# Patient Record
Sex: FEMALE | Race: WHITE | NOT HISPANIC OR LATINO | Employment: OTHER | URBAN - NONMETROPOLITAN AREA
[De-identification: names, ages, dates, MRNs, and addresses within clinical notes are randomized per-mention and may not be internally consistent; named-entity substitution may affect disease eponyms.]

---

## 2024-10-15 ENCOUNTER — OFFICE VISIT (OUTPATIENT)
Dept: URGENT CARE | Facility: CLINIC | Age: 69
End: 2024-10-15
Payer: MEDICARE

## 2024-10-15 VITALS
TEMPERATURE: 97.8 F | SYSTOLIC BLOOD PRESSURE: 122 MMHG | DIASTOLIC BLOOD PRESSURE: 74 MMHG | OXYGEN SATURATION: 94 % | RESPIRATION RATE: 16 BRPM | WEIGHT: 160 LBS | HEART RATE: 80 BPM | BODY MASS INDEX: 26.66 KG/M2 | HEIGHT: 65 IN

## 2024-10-15 DIAGNOSIS — J98.01 BRONCHOSPASM: ICD-10-CM

## 2024-10-15 DIAGNOSIS — B96.89 BACTERIAL SINUSITIS: ICD-10-CM

## 2024-10-15 DIAGNOSIS — J32.9 BACTERIAL SINUSITIS: ICD-10-CM

## 2024-10-15 PROCEDURE — 3078F DIAST BP <80 MM HG: CPT

## 2024-10-15 PROCEDURE — 3074F SYST BP LT 130 MM HG: CPT

## 2024-10-15 PROCEDURE — 99213 OFFICE O/P EST LOW 20 MIN: CPT

## 2024-10-15 RX ORDER — ALBUTEROL SULFATE 90 UG/1
2 INHALANT RESPIRATORY (INHALATION) EVERY 6 HOURS PRN
Qty: 8.5 G | Refills: 0 | Status: SHIPPED | OUTPATIENT
Start: 2024-10-15

## 2024-10-15 RX ORDER — METHOTREXATE 2.5 MG/1
TABLET ORAL
COMMUNITY
Start: 2020-10-15

## 2024-10-15 RX ORDER — METHOCARBAMOL 750 MG/1
TABLET, FILM COATED ORAL
COMMUNITY
Start: 2024-08-20

## 2024-10-15 RX ORDER — TRAZODONE HYDROCHLORIDE 50 MG/1
TABLET, FILM COATED ORAL
COMMUNITY
Start: 2021-10-15

## 2024-10-15 RX ORDER — FOLIC ACID 1 MG/1
TABLET ORAL
COMMUNITY
Start: 2020-10-15

## 2024-10-15 RX ORDER — DILTIAZEM HYDROCHLORIDE 180 MG/1
180 CAPSULE, EXTENDED RELEASE ORAL
COMMUNITY
Start: 2024-08-15

## 2024-10-15 RX ORDER — LEVOTHYROXINE SODIUM 75 UG/1
TABLET ORAL
COMMUNITY
Start: 2015-10-15

## 2024-10-15 RX ORDER — DILTIAZEM HYDROCHLORIDE 120 MG/1
TABLET, FILM COATED ORAL
COMMUNITY
Start: 2022-10-15

## 2024-10-15 RX ORDER — ACYCLOVIR 400 MG/1
TABLET ORAL
COMMUNITY
Start: 2024-09-19

## 2024-10-15 ASSESSMENT — VISUAL ACUITY: OU: 1

## 2025-02-11 ENCOUNTER — OFFICE VISIT (OUTPATIENT)
Dept: URGENT CARE | Facility: CLINIC | Age: 70
End: 2025-02-11
Payer: MEDICARE

## 2025-02-11 VITALS
RESPIRATION RATE: 18 BRPM | SYSTOLIC BLOOD PRESSURE: 118 MMHG | HEART RATE: 85 BPM | OXYGEN SATURATION: 96 % | HEIGHT: 66 IN | TEMPERATURE: 98.8 F | WEIGHT: 169 LBS | BODY MASS INDEX: 27.16 KG/M2 | DIASTOLIC BLOOD PRESSURE: 60 MMHG

## 2025-02-11 DIAGNOSIS — J06.9 VIRAL URI WITH COUGH: ICD-10-CM

## 2025-02-11 PROCEDURE — 99213 OFFICE O/P EST LOW 20 MIN: CPT | Performed by: PHYSICIAN ASSISTANT

## 2025-02-11 PROCEDURE — 3074F SYST BP LT 130 MM HG: CPT | Performed by: PHYSICIAN ASSISTANT

## 2025-02-11 PROCEDURE — 3078F DIAST BP <80 MM HG: CPT | Performed by: PHYSICIAN ASSISTANT

## 2025-02-11 RX ORDER — PREDNISONE 20 MG/1
TABLET ORAL
Qty: 8 TABLET | Refills: 0 | Status: SHIPPED | OUTPATIENT
Start: 2025-02-11

## 2025-02-11 RX ORDER — BENZONATATE 100 MG/1
100 CAPSULE ORAL 3 TIMES DAILY PRN
Qty: 30 CAPSULE | Refills: 0 | Status: SHIPPED | OUTPATIENT
Start: 2025-02-11

## 2025-02-11 ASSESSMENT — ENCOUNTER SYMPTOMS
CHILLS: 1
FEVER: 1
HEADACHES: 1
WHEEZING: 1
COUGH: 1
MYALGIAS: 1
SORE THROAT: 1

## 2025-02-11 NOTE — PROGRESS NOTES
Subjective     Edilia Foster is a 69 y.o. female who presents with Cough (X 4 days)    HPI:  Edilia Foster is a 69 y.o. female who presents today for evaluation of cough and URI symptoms.  States that she has been sick for the past 4 days.  She has had fever, chills, congestion, runny nose, sore throat, cough.  She says that she did an at home COVID test and it was negative.  Says that the cough is now what is most bothersome for her.  Says that she feels chest congestion and coughs constantly.  She also notices some wheezing when she lays down at nighttime.  No history of asthma or COPD.  She is requesting steroids.        Review of Systems   Constitutional:  Positive for chills, fever and malaise/fatigue.   HENT:  Positive for congestion and sore throat.    Respiratory:  Positive for cough and wheezing.    Musculoskeletal:  Positive for myalgias.   Neurological:  Positive for headaches.           PMH:  has no past medical history on file.  MEDS:   Current Outpatient Medications:     traZODone (DESYREL) 50 MG Tab, , Disp: , Rfl:     methotrexate 2.5 MG tablet, , Disp: , Rfl:     methocarbamol (ROBAXIN) 750 MG Tab, TAKE 1 TABLET (750 MG TOTAL) BY MOUTH DAILY AS NEEDED FOR MUSCLE SPASMS, Disp: , Rfl:     levothyroxine (SYNTHROID) 75 MCG Tab, , Disp: , Rfl:     folic acid (FOLVITE) 1 MG Tab, , Disp: , Rfl:     diltiazem (TIAZAC) 180 MG SR capsule, Take 180 mg by mouth every day., Disp: , Rfl:     dilTIAZem (CARDIZEM) 120 MG Tab, , Disp: , Rfl:     acyclovir (ZOVIRAX) 400 MG tablet, TAKE AS NEEDED TWICE A DAY, Disp: , Rfl:     albuterol 108 (90 Base) MCG/ACT Aero Soln inhalation aerosol, Inhale 2 Puffs every 6 hours as needed for Shortness of Breath., Disp: 8.5 g, Rfl: 0  ALLERGIES: No Known Allergies  SURGHX: History reviewed. No pertinent surgical history.  SOCHX:    FH: Family history was reviewed, no pertinent findings to report      Objective     /60   Pulse 85   Temp 37.1 °C (98.8 °F) (Temporal)   " Resp 18   Ht 1.664 m (5' 5.5\")   Wt 76.7 kg (169 lb)   SpO2 96%   BMI 27.70 kg/m²      Physical Exam  Constitutional:       Appearance: She is well-developed.   HENT:      Head: Normocephalic and atraumatic.      Right Ear: Tympanic membrane, ear canal and external ear normal.      Left Ear: Tympanic membrane, ear canal and external ear normal.      Nose: Mucosal edema and congestion present. No rhinorrhea.      Mouth/Throat:      Lips: Pink.      Mouth: Mucous membranes are moist.      Pharynx: Oropharynx is clear.   Eyes:      Conjunctiva/sclera: Conjunctivae normal.      Pupils: Pupils are equal, round, and reactive to light.   Cardiovascular:      Rate and Rhythm: Normal rate and regular rhythm.      Heart sounds: Normal heart sounds. No murmur heard.  Pulmonary:      Effort: Pulmonary effort is normal.      Breath sounds: Normal breath sounds. No wheezing.   Musculoskeletal:      Cervical back: Normal range of motion.   Lymphadenopathy:      Cervical: No cervical adenopathy.   Skin:     General: Skin is warm and dry.      Capillary Refill: Capillary refill takes less than 2 seconds.   Neurological:      Mental Status: She is alert and oriented to person, place, and time.   Psychiatric:         Behavior: Behavior normal.         Judgment: Judgment normal.             Assessment & Plan     1. Viral URI with cough  - predniSONE (DELTASONE) 20 MG Tab; Take 2 tabs once daily for 4 days  Dispense: 8 Tablet; Refill: 0  - benzonatate (TESSALON) 100 MG Cap; Take 1 Capsule by mouth 3 times a day as needed for Cough.  Dispense: 30 Capsule; Refill: 0  Patient presenting for evaluation of 4 days of URI symptoms.  Home COVID test negative.  Symptoms likely secondary to influenza but she is outside of the window for antiviral treatment.  Will trial a short burst of oral prednisone.  Benzonatate capsules also sent to help with symptoms  - OTC cold/flu medications  -Supportive care also discussed to include the use of " saline nasal rinses, steam inhalation, and the use of a cool-mist humidifier in the bedroom at night.  - PO fluids  - Rest  - Tylenol or ibuprofen as needed for fever > 100.4 F            Differential Diagnosis, natural history, and supportive care discussed. Return to the Urgent Care or follow up with your PCP if symptoms fail to resolve, or for any new or worsening symptoms. Emergency room precautions discussed. Patient and/or family appears understanding of information.

## 2025-03-18 ENCOUNTER — OFFICE VISIT (OUTPATIENT)
Dept: URGENT CARE | Facility: CLINIC | Age: 70
End: 2025-03-18
Payer: MEDICARE

## 2025-03-18 ENCOUNTER — APPOINTMENT (OUTPATIENT)
Dept: RADIOLOGY | Facility: IMAGING CENTER | Age: 70
End: 2025-03-18
Attending: NURSE PRACTITIONER
Payer: MEDICARE

## 2025-03-18 VITALS
OXYGEN SATURATION: 98 % | WEIGHT: 170 LBS | HEART RATE: 89 BPM | DIASTOLIC BLOOD PRESSURE: 76 MMHG | BODY MASS INDEX: 27.32 KG/M2 | SYSTOLIC BLOOD PRESSURE: 110 MMHG | RESPIRATION RATE: 12 BRPM | TEMPERATURE: 98 F | HEIGHT: 66 IN

## 2025-03-18 DIAGNOSIS — R05.3 PERSISTENT COUGH: ICD-10-CM

## 2025-03-18 DIAGNOSIS — R09.82 POST-NASAL DRAINAGE: ICD-10-CM

## 2025-03-18 PROCEDURE — 3074F SYST BP LT 130 MM HG: CPT | Performed by: NURSE PRACTITIONER

## 2025-03-18 PROCEDURE — 3078F DIAST BP <80 MM HG: CPT | Performed by: NURSE PRACTITIONER

## 2025-03-18 PROCEDURE — 71046 X-RAY EXAM CHEST 2 VIEWS: CPT | Mod: TC | Performed by: NURSE PRACTITIONER

## 2025-03-18 PROCEDURE — 99213 OFFICE O/P EST LOW 20 MIN: CPT | Performed by: NURSE PRACTITIONER

## 2025-03-18 RX ORDER — BENZONATATE 100 MG/1
100 CAPSULE ORAL 3 TIMES DAILY PRN
Qty: 60 CAPSULE | Refills: 0 | Status: SHIPPED | OUTPATIENT
Start: 2025-03-18

## 2025-03-18 NOTE — PROGRESS NOTES
Edilia Foster is a 69 y.o. female who presents for Cough (X on and off sine October 2024 ( requesting  Chest Xray))      HPI  This is a new problem. Edilia Foster is a 69 y.o. patient who presents to urgent care with c/o: coughing worse in the evening and the morning.  Has a tickle in her throat with head drainage.   Seen in urgent care in Oct 2024 and then again in Feb 2025.   Tx tried; mucinex, ibuprofen, benzonate ( just restarted it again about 3 days ago), taking allergy medication daily.     ROS See HPI    Allergies:     No Known Allergies    PMSFS Hx:  No past medical history on file.  No past surgical history on file.  No family history on file.  Social History     Tobacco Use    Smoking status: Not on file    Smokeless tobacco: Not on file   Substance Use Topics    Alcohol use: Not on file         Problems:   There is no problem list on file for this patient.      Medications:   Current Outpatient Medications on File Prior to Visit   Medication Sig Dispense Refill    benzonatate (TESSALON) 100 MG Cap Take 1 Capsule by mouth 3 times a day as needed for Cough. 30 Capsule 0    traZODone (DESYREL) 50 MG Tab       methotrexate 2.5 MG tablet       methocarbamol (ROBAXIN) 750 MG Tab TAKE 1 TABLET (750 MG TOTAL) BY MOUTH DAILY AS NEEDED FOR MUSCLE SPASMS      levothyroxine (SYNTHROID) 75 MCG Tab       folic acid (FOLVITE) 1 MG Tab       diltiazem (TIAZAC) 180 MG SR capsule Take 180 mg by mouth every day.      acyclovir (ZOVIRAX) 400 MG tablet TAKE AS NEEDED TWICE A DAY      albuterol 108 (90 Base) MCG/ACT Aero Soln inhalation aerosol Inhale 2 Puffs every 6 hours as needed for Shortness of Breath. 8.5 g 0    predniSONE (DELTASONE) 20 MG Tab Take 2 tabs once daily for 4 days (Patient not taking: Reported on 3/18/2025) 8 Tablet 0    dilTIAZem (CARDIZEM) 120 MG Tab  (Patient not taking: Reported on 3/18/2025)       No current facility-administered medications on file prior to visit.        Objective:     BP  "110/76   Pulse 89   Temp 36.7 °C (98 °F) (Temporal)   Resp 12   Ht 1.664 m (5' 5.5\")   Wt 77.1 kg (170 lb)   SpO2 98%   BMI 27.86 kg/m²     Physical Exam  Vitals reviewed.   Constitutional:       Appearance: Normal appearance. She is normal weight.   Cardiovascular:      Rate and Rhythm: Normal rate.      Pulses: Normal pulses.      Heart sounds: Normal heart sounds.   Pulmonary:      Effort: Pulmonary effort is normal.      Breath sounds: Normal breath sounds.      Comments: Dry cough +   Skin:     General: Skin is warm and dry.      Capillary Refill: Capillary refill takes less than 2 seconds.   Neurological:      Mental Status: She is alert and oriented to person, place, and time.   Psychiatric:         Mood and Affect: Mood normal.     Xray was reviewed and interpreted independently by me.     RADIOLOGY RESULTS   DX-CHEST-2 VIEWS  Result Date: 3/18/2025  3/18/2025 12:09 PM HISTORY/REASON FOR EXAM: Cough TECHNIQUE/EXAM DESCRIPTION AND NUMBER OF VIEWS: Two views of the chest. COMPARISON: None. FINDINGS: There is no evidence of focal consolidation or evidence of pulmonary edema. The heart is normal in size. There is no evidence of pleural effusion. Soft tissues and bony structures are unremarkable.     No evidence of acute cardiopulmonary process.             Assessment /Associated Orders:      1. Persistent cough  DX-CHEST-2 VIEWS    benzonatate (TESSALON) 100 MG Cap      2. Post-nasal drainage  DX-CHEST-2 VIEWS            Medical Decision Making:    Edilia Foster is a very pleasant 69 y.o. female who is clinically stable at today's acute urgent care visit. Presents with acute problem/ concern today.    No acute distress is noted at the time of the visit.  VSS. Appropriate for outpatient care at this time.   Persistent cough on and off since October.  Chest x-ray was normal and showed no acute processes.  Physical exam is reassuring today.  Oxygen saturation is 98% on room air.  No underlying " comorbidities significant to her cough.  Educated in proper administration of  prescription medication(s) ordered today including safety, possible SE, risks, benefits, rationale and alternatives to therapy.   OTC non-drowsy antihistamine of choice. Follow manufactures dosing and safety guidelines.    Cool mist humidifier at night prn       Through shared decision making a discussion of the Dx and DDx, management options (risks,benefits, and alternatives to planned treatment), natural progression, supportive care and indications for immediate follow-up discussed. Expressed understanding and the treatment plan was agreed upon.    Questions were encouraged and answered     Follow Up:   Return to urgent care prn if new or worsening sx or if there is no improvement in condition prn.    Recommend follow-up appointment with her PCP if her cough persists  Educated in Red flags and indications to immediately call 911 or present to the Emergency Department.           Please note that this dictation was created using voice recognition software. I have worked with consultants from the vendor as well as technical experts from [facility] to optimize the interface. I have made every reasonable attempt to correct obvious errors, but I expect that there are errors of grammar and possibly content that I did not discover before finalizing the note.  This note was electronically signed by provider